# Patient Record
Sex: MALE | Race: WHITE | NOT HISPANIC OR LATINO | Employment: OTHER | URBAN - METROPOLITAN AREA
[De-identification: names, ages, dates, MRNs, and addresses within clinical notes are randomized per-mention and may not be internally consistent; named-entity substitution may affect disease eponyms.]

---

## 2018-08-16 ENCOUNTER — OFFICE VISIT CONVERTED (OUTPATIENT)
Dept: FAMILY MEDICINE CLINIC | Facility: CLINIC | Age: 53
End: 2018-08-16
Attending: FAMILY MEDICINE

## 2020-07-18 ENCOUNTER — OFFICE VISIT CONVERTED (OUTPATIENT)
Dept: FAMILY MEDICINE CLINIC | Facility: CLINIC | Age: 55
End: 2020-07-18
Attending: FAMILY MEDICINE

## 2021-05-07 NOTE — PROGRESS NOTES
Progress Note      Patient Name: Joe Andersen   Patient ID: [id number]   Sex: Male   YOB: 1965        Visit Date: July 18, 2020    Provider: Tamy Winn DO   Location: [facility]   Location Address: [address] [place] [address]   [place]  [address]   Location Phone: [phone]          Chief Complaint     RIGHT EAR PAIN       History Of Present Illness  Joe Andersen is a 55 year old /White male who presents for evaluation and treatment of:      1.) RT EAR PAIN : Complaining of right ear pain and drainage. Reports experiencing edema and tenderness with palpation. No fevers per patient. No recent swimming. No hearing loss.       Past Medical History  Disease Name Date Onset Notes   Seasonal allergies --  --          Past Surgical History  Procedure Name Date Notes   Sinus Surgery --  --          Medication List  Name Date Started Instructions   Flonase Allergy Relief 50 mcg/actuation nasal spray,suspension  --          Allergy List  Allergen Name Date Reaction Notes   NO KNOWN DRUG ALLERGIES --  --  --        Allergies Reconciled  Family Medical History  Disease Name Relative/Age Notes   Father, Grandfather, or Brother developed Heart Disease before the age of 65  --          Social History  Finding Status Start/Stop Quantity Notes   Alcohol Current every day --/-- --  drinks alcohol, 7 or less drinks per week   Exercises regularly --  --/-- --  5-6 times per week   Recreational Drug Use Never --/-- --  never used   Second hand smoke exposure Unknown --/-- --  no   Tobacco Former --/-- 2 PPD former smoker, smokes 1 to 2 packs per day, for 20 years, never uses other tobacco products   Uses seatbelts --  --/-- --  yes         Immunizations  NameDate Admin Mfg Trade Name Lot Number Route Inj VIS Given VIS Publication   Tdap08/16/2018 [facility] ADACEL [id number] IM  08/16/2018 02/24/2015   Comments: NDC [id number]         Review of Systems  · Constitutional  o Denies  o : fatigue,  "night sweats  · Eyes  o Denies  o : double vision, blurred vision  · HENT  o Admits  o : right ear pain  o Denies  o : vertigo, recent head injury  · Cardiovascular  o Denies  o : chest pain, irregular heart beats  · Respiratory  o Denies  o : shortness of breath, productive cough  · Gastrointestinal  o Denies  o : nausea, vomiting  · Genitourinary  o Denies  o : dysuria, urinary retention  · Integument  o Denies  o : hair growth change, new skin lesions  · Neurologic  o Denies  o : altered mental status, seizures  · Musculoskeletal  o Denies  o : joint swelling, limitation of motion  · Endocrine  o Denies  o : cold intolerance, heat intolerance  · Heme-Lymph  o Denies  o : petechiae, lymph node enlargement or tenderness  · Allergic-Immunologic  o Denies  o : frequent illnesses      Vitals  Date Time BP Position Site L\R Cuff Size HR RR TEMP (F) WT  HT  BMI kg/m2 BSA m2 O2 Sat HC       07/18/2020 08:23 /72 Sitting    75 - R  97.3 228lbs 4oz 6'  1\" 30.11 2.31 95 %          Physical Examination  · Constitutional  o Appearance  o : well developed, well-nourished, in no acute distress  · Head and Face  o HEENT  o : Examination of right external ear canal reveals edema, as well as erythema - tenderness noted with otoscope examination - TM unremarkable  · Eyes  o Conjunctivae  o : conjunctivae normal  · Neck  o Inspection/Palpation  o : supple  · Respiratory  o Respiratory Effort  o : breathing unlabored  · Cardiovascular  o Peripheral Vascular System  o :   § Extremities  § : no cyanosis  · Skin and Subcutaneous Tissue  o General Inspection  o : no rash appreciated   · Neurologic  o Gait and Station  o :   § Gait Screening  § : normal gait  · Psychiatric  o Mood and Affect  o : mood normal, affect appropriate              Assessment  · Right otitis externa     380.10/H60.91    A.) Otic drop and oral abx as noted - if no relief contact provider.         Plan  · Orders  o ACO-39: Current medications updated and " reviewed () - - 07/18/2020  · Medications  o hydrocortisone-acetic acid 1-2 % otic (ear) drops   SIG: instill 2 drops into right ear by otic route 3 times per day for 5 days   DISP: (1) 10 ml drop btl with 0 refills  Prescribed on 07/18/2020     o ciprofloxacin HCl 500 mg oral tablet   SIG: take 1 tablet (500 mg) by oral route 2 times per day for 5 days   DISP: (10) tablets with 0 refills  Prescribed on 07/18/2020     o Medications have been Reconciled  o Transition of Care or Provider Policy  · Instructions  o Take all medications as prescribed/directed.  o Patient was educated/instructed on their diagnosis, treatment and medications prior to discharge from the clinic today.  · Disposition  o Call or Return if symptoms worsen or persist.            Electronically Signed by: Tamy Winn DO - on July 18, 2020 08:59:55 AM

## 2021-05-07 NOTE — PROGRESS NOTES
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- no recent TDAP- hemostasis has been achieved- pt will be going to [place] this weekend- pt urged not to put cuts in ocean due to possibility of exotic bacteria getting into cuts- no redness or warmth       Review of Systems  · Constitutional  o Denies  o : fatigue, fever  · Cardiovascular  o Denies  o : chest pain, palpitations  · Respiratory  o Denies  o : shortness of breath, cough      Vitals  Date Time BP Position Site L\R Cuff Size HR RR TEMP(F) WT  HT  BMI kg/m2 BSA m2 O2 Sat HC       08/16/2018 08:35 / Sitting    73 - R  97.2 233lbs 0oz 6'  1\" 30.74 2.33 98 %           Physical Examination  · Constitutional  o Appearance  o : well developed, well-nourished, in no acute distress  · Respiratory  o Respiratory Effort  o : breathing unlabored  o Auscultation of Lungs  o : clear to ascultation  · Cardiovascular  o Heart  o :   § Auscultation of Heart  § : regular rate and rhythm  · Musculoskeletal  o General  o :   § General Musculoskeletal  § : No joint swelling or deformity., Muscle tone, strength, and development grossly normal.  · Skin and Subcutaneous Tissue  o General Inspection  o : left elbow 0.25cm lac, right index finger 3-4mm lac, no redness or warmth  · Neurologic  o Mental Status Examination  o :   § Orientation  § : grossly oriented to person, place and time  o Gait and " Station  o :   § Gait Screening  § : normal gait              Assessment  · Finger laceration     883.0/S61.219A      Plan  · Orders  o ACO-39: Current medications updated and reviewed () - - 08/16/2018  o Boostrix Vaccine [facility] ([id number]) - 883.0/S61.219A - 08/16/2018  · Medications  o Keflex 500 mg oral capsule   SIG: take 1 capsule (500 mg) by oral route every 6 hours for 7 days   DISP: (28) capsules with 0 refills  Prescribed on 08/16/2018     o mupirocin 2 % topical ointment   SIG: apply a small amount to the affected area by topical route 3 times per day for 10 days   DISP: (1) 1 gm pf appli with 0 refills  Prescribed on 08/16/2018     · Instructions  o Patient was educated/instructed on their diagnosis, treatment and medications prior to discharge from the clinic today.            Electronically Signed by: Denny Singh MD -Author on August 16, 2018 08:53:00 PM

## 2021-05-09 VITALS
BODY MASS INDEX: 30.25 KG/M2 | WEIGHT: 228.25 LBS | DIASTOLIC BLOOD PRESSURE: 72 MMHG | SYSTOLIC BLOOD PRESSURE: 128 MMHG | TEMPERATURE: 97.3 F | OXYGEN SATURATION: 95 % | HEIGHT: 73 IN | HEART RATE: 75 BPM

## 2021-05-09 VITALS
HEART RATE: 73 BPM | WEIGHT: 233 LBS | HEIGHT: 73 IN | DIASTOLIC BLOOD PRESSURE: 62 MMHG | SYSTOLIC BLOOD PRESSURE: 128 MMHG | TEMPERATURE: 97.2 F | BODY MASS INDEX: 30.88 KG/M2 | OXYGEN SATURATION: 98 %

## 2021-08-21 ENCOUNTER — APPOINTMENT (OUTPATIENT)
Dept: GENERAL RADIOLOGY | Facility: HOSPITAL | Age: 56
End: 2021-08-21

## 2021-08-21 ENCOUNTER — HOSPITAL ENCOUNTER (EMERGENCY)
Facility: HOSPITAL | Age: 56
Discharge: HOME | End: 2021-08-21
Attending: EMERGENCY MEDICINE | Admitting: EMERGENCY MEDICINE

## 2021-08-21 VITALS
TEMPERATURE: 98.1 F | BODY MASS INDEX: 32.37 KG/M2 | DIASTOLIC BLOOD PRESSURE: 80 MMHG | HEART RATE: 49 BPM | RESPIRATION RATE: 16 BRPM | HEIGHT: 73 IN | SYSTOLIC BLOOD PRESSURE: 122 MMHG | OXYGEN SATURATION: 100 % | WEIGHT: 244.27 LBS

## 2021-08-21 DIAGNOSIS — R07.9 CHEST PAIN IN ADULT: Primary | ICD-10-CM

## 2021-08-21 LAB
ALBUMIN SERPL-MCNC: 4.8 G/DL (ref 3.5–5.2)
ALBUMIN/GLOB SERPL: 2.3 G/DL
ALP SERPL-CCNC: 80 U/L (ref 39–117)
ALT SERPL W P-5'-P-CCNC: 33 U/L (ref 1–41)
ANION GAP SERPL CALCULATED.3IONS-SCNC: 9.1 MMOL/L (ref 5–15)
AST SERPL-CCNC: 20 U/L (ref 1–40)
BASOPHILS # BLD AUTO: 0.04 10*3/MM3 (ref 0–0.2)
BASOPHILS NFR BLD AUTO: 0.6 % (ref 0–1.5)
BILIRUB SERPL-MCNC: 0.6 MG/DL (ref 0–1.2)
BUN SERPL-MCNC: 14 MG/DL (ref 6–20)
BUN/CREAT SERPL: 12.8 (ref 7–25)
CALCIUM SPEC-SCNC: 9.8 MG/DL (ref 8.6–10.5)
CHLORIDE SERPL-SCNC: 103 MMOL/L (ref 98–107)
CK MB SERPL-CCNC: 2.1 NG/ML
CK SERPL-CCNC: 104 U/L (ref 20–200)
CO2 SERPL-SCNC: 27.9 MMOL/L (ref 22–29)
CREAT SERPL-MCNC: 1.09 MG/DL (ref 0.76–1.27)
DEPRECATED RDW RBC AUTO: 39 FL (ref 37–54)
EOSINOPHIL # BLD AUTO: 0.26 10*3/MM3 (ref 0–0.4)
EOSINOPHIL NFR BLD AUTO: 3.8 % (ref 0.3–6.2)
ERYTHROCYTE [DISTWIDTH] IN BLOOD BY AUTOMATED COUNT: 12.3 % (ref 12.3–15.4)
GFR SERPL CREATININE-BSD FRML MDRD: 70 ML/MIN/1.73
GLOBULIN UR ELPH-MCNC: 2.1 GM/DL
GLUCOSE SERPL-MCNC: 70 MG/DL (ref 65–99)
HCT VFR BLD AUTO: 45.6 % (ref 37.5–51)
HGB BLD-MCNC: 16 G/DL (ref 13–17.7)
HOLD SPECIMEN: NORMAL
IMM GRANULOCYTES # BLD AUTO: 0.01 10*3/MM3 (ref 0–0.05)
IMM GRANULOCYTES NFR BLD AUTO: 0.1 % (ref 0–0.5)
LIPASE SERPL-CCNC: 24 U/L (ref 13–60)
LYMPHOCYTES # BLD AUTO: 1.89 10*3/MM3 (ref 0.7–3.1)
LYMPHOCYTES NFR BLD AUTO: 27.4 % (ref 19.6–45.3)
MAGNESIUM SERPL-MCNC: 2.2 MG/DL (ref 1.6–2.6)
MCH RBC QN AUTO: 30.5 PG (ref 26.6–33)
MCHC RBC AUTO-ENTMCNC: 35.1 G/DL (ref 31.5–35.7)
MCV RBC AUTO: 87 FL (ref 79–97)
MONOCYTES # BLD AUTO: 0.68 10*3/MM3 (ref 0.1–0.9)
MONOCYTES NFR BLD AUTO: 9.9 % (ref 5–12)
NEUTROPHILS NFR BLD AUTO: 4.02 10*3/MM3 (ref 1.7–7)
NEUTROPHILS NFR BLD AUTO: 58.2 % (ref 42.7–76)
NRBC BLD AUTO-RTO: 0 /100 WBC (ref 0–0.2)
NT-PROBNP SERPL-MCNC: 10.6 PG/ML (ref 0–900)
PLATELET # BLD AUTO: 185 10*3/MM3 (ref 140–450)
PMV BLD AUTO: 9.8 FL (ref 6–12)
POTASSIUM SERPL-SCNC: 3.9 MMOL/L (ref 3.5–5.2)
PROT SERPL-MCNC: 6.9 G/DL (ref 6–8.5)
QT INTERVAL: 413 MS
QT INTERVAL: 431 MS
RBC # BLD AUTO: 5.24 10*6/MM3 (ref 4.14–5.8)
SODIUM SERPL-SCNC: 140 MMOL/L (ref 136–145)
TROPONIN I SERPL-MCNC: 0 NG/ML (ref 0–0.6)
TROPONIN I SERPL-MCNC: 0 NG/ML (ref 0–0.6)
WBC # BLD AUTO: 6.9 10*3/MM3 (ref 3.4–10.8)
WHOLE BLOOD HOLD SPECIMEN: NORMAL

## 2021-08-21 PROCEDURE — 83880 ASSAY OF NATRIURETIC PEPTIDE: CPT | Performed by: EMERGENCY MEDICINE

## 2021-08-21 PROCEDURE — 85025 COMPLETE CBC W/AUTO DIFF WBC: CPT | Performed by: EMERGENCY MEDICINE

## 2021-08-21 PROCEDURE — 80053 COMPREHEN METABOLIC PANEL: CPT | Performed by: EMERGENCY MEDICINE

## 2021-08-21 PROCEDURE — 83735 ASSAY OF MAGNESIUM: CPT | Performed by: EMERGENCY MEDICINE

## 2021-08-21 PROCEDURE — 71045 X-RAY EXAM CHEST 1 VIEW: CPT | Performed by: RADIOLOGY

## 2021-08-21 PROCEDURE — 93005 ELECTROCARDIOGRAM TRACING: CPT

## 2021-08-21 PROCEDURE — 84484 ASSAY OF TROPONIN QUANT: CPT

## 2021-08-21 PROCEDURE — 93005 ELECTROCARDIOGRAM TRACING: CPT | Performed by: EMERGENCY MEDICINE

## 2021-08-21 PROCEDURE — 82550 ASSAY OF CK (CPK): CPT | Performed by: EMERGENCY MEDICINE

## 2021-08-21 PROCEDURE — 99283 EMERGENCY DEPT VISIT LOW MDM: CPT

## 2021-08-21 PROCEDURE — 71045 X-RAY EXAM CHEST 1 VIEW: CPT

## 2021-08-21 PROCEDURE — 82553 CREATINE MB FRACTION: CPT | Performed by: EMERGENCY MEDICINE

## 2021-08-21 PROCEDURE — 83690 ASSAY OF LIPASE: CPT | Performed by: EMERGENCY MEDICINE

## 2021-08-21 PROCEDURE — 93010 ELECTROCARDIOGRAM REPORT: CPT | Performed by: INTERNAL MEDICINE

## 2021-08-21 RX ORDER — SODIUM CHLORIDE 0.9 % (FLUSH) 0.9 %
10 SYRINGE (ML) INJECTION AS NEEDED
Status: DISCONTINUED | OUTPATIENT
Start: 2021-08-21 | End: 2021-08-21 | Stop reason: HOSPADM

## 2021-08-21 RX ORDER — ASPIRIN 81 MG/1
324 TABLET, CHEWABLE ORAL ONCE
Status: DISCONTINUED | OUTPATIENT
Start: 2021-08-21 | End: 2021-08-21

## 2021-08-21 NOTE — DISCHARGE INSTRUCTIONS
Low-dose, 81 mg, aspirin daily until evaluated by the cardiologist.  Avoid exertion.  Return for worsening persistent chest discomfort or other concerns.

## 2021-08-21 NOTE — ED PROVIDER NOTES
"Time: 11:23 AM EDT  Arrived by: Private car  Chief Complaint:   Chief Complaint   Patient presents with   • Chest Pain     x 2 months     History provided by: Patient  History is limited by: N/A     History of Present Illness:      Joe Andersen is a 56 y.o. male who presents to the emergency department today with complaints of chest pain for the past 3-4 months. He notes that his pains have been intermittent and that they \"come around the sides and on the top sometimes.\" He denies any significant modifying factors, but does state that his discomfort potentially occurs more frequently while he is exerting himself at work. He states that his pain has not necessarily worsened with time in terms of intensity, but that he has been putting off seeing a provider for several months and decided he needed to be evaluated due to its persistence. His discomfort is presently mild.    The patient denies any vomiting or shortness of breath. He does note some diaphoresis occasionally, but believes this is just due to exerting himself at work rather than a direct association with his chest pain. He denies a history of prior blood clots or confirmed myocardial infarction. He does have a history of high cholesterol, but denies a known history of hypertension or diabetes mellitus. He also denies a family history of myocardial infarction prior to age 55. He is a former smoker with last use 7-8 months ago. There are no other acute complaints at this time.      History provided by:  Patient   used: No    Chest Pain  Chest pain location: Diffuse.  Pain quality comment:  \"pain\"  Pain radiates to:  Does not radiate  Pain severity:  Moderate  Onset quality:  Sudden  Duration:  4 months  Timing:  Intermittent  Progression:  Unchanged  Chronicity:  New  Context comment:  Patient has had intermittent chest pains for the past 3-4 months. He is unsure of any definite modiyfing factors but believes his pains might occur more " "frequently at work while exerting himself.  Relieved by:  None tried  Worsened by:  Exertion (possibly)  Ineffective treatments:  None tried  Associated symptoms: no back pain, no cough, no diaphoresis (occasional but patient attributes to normal exertional diaphoresis at work), no fever, no shortness of breath and no vomiting    Risk factors: high cholesterol and male sex    Risk factors: no coronary artery disease, no diabetes mellitus, no hypertension, no prior DVT/PE and no smoking (former)        Similar Symptoms Previously: Yes.  Recently seen: Patient was last seen in the ED on 4/25/2019 with chest pain.      Patient Care Team  Primary Care Provider: Provider, No Known    Past Medical History:     No Known Allergies  No past medical history on file.  No past surgical history on file.  No family history on file.    Home Medications:  Prior to Admission medications    Not on File        Social History:   Social History     Tobacco Use   • Smoking status: Not on file   Substance Use Topics   • Alcohol use: Not on file   • Drug use: Not on file     Recent travel: not applicable     Review of Systems:  Review of Systems   Constitutional: Negative for chills, diaphoresis (occasional but patient attributes to normal exertional diaphoresis at work) and fever.   HENT: Negative for nosebleeds.    Eyes: Negative for redness.   Respiratory: Negative for cough and shortness of breath.    Cardiovascular: Positive for chest pain.   Gastrointestinal: Negative for diarrhea and vomiting.   Genitourinary: Negative for dysuria and frequency.   Musculoskeletal: Negative for back pain and neck pain.   Skin: Negative for rash.   Neurological: Negative for seizures.   All other systems reviewed and are negative.       Physical Exam:  /80 (BP Location: Left arm)   Pulse (!) 49   Temp 98.1 °F (36.7 °C) (Oral)   Resp 16   Ht 185.4 cm (73\")   Wt 111 kg (244 lb 4.3 oz)   SpO2 100%   BMI 32.23 kg/m²     Physical Exam  Vitals " and nursing note reviewed.   Constitutional:       General: He is not in acute distress.  HENT:      Head: Normocephalic and atraumatic.      Nose: Nose normal.      Mouth/Throat:      Mouth: Mucous membranes are moist.   Eyes:      General: No scleral icterus.  Cardiovascular:      Rate and Rhythm: Normal rate and regular rhythm.      Heart sounds: Normal heart sounds. No murmur heard.     Pulmonary:      Effort: No respiratory distress.      Breath sounds: Normal breath sounds.   Chest:      Chest wall: Tenderness (Mild reproducible chest wall discomfort. ) present.   Abdominal:      Palpations: Abdomen is soft.      Tenderness: There is no abdominal tenderness.   Musculoskeletal:         General: No tenderness. Normal range of motion.      Cervical back: Normal range of motion and neck supple. No tenderness.      Right lower leg: No edema.      Left lower leg: No edema.   Skin:     General: Skin is warm and dry.   Neurological:      General: No focal deficit present.      Mental Status: He is alert.      Sensory: No sensory deficit.      Motor: No weakness.   Psychiatric:         Behavior: Behavior normal.                Medications in the Emergency Department:  Medications - No data to display     Labs  Lab Results (last 24 hours)     Procedure Component Value Units Date/Time    POC Troponin I with Hold Tube [[id number]] Collected: 08/21/21 1122    Specimen: Blood Updated: 08/21/21 1136    Narrative:      The following orders were created for panel order POC Troponin I with Hold Tube.  Procedure                               Abnormality         Status                     ---------                               -----------         ------                     POC Troponin I[[id number]]                                                              HOLD Troponin-I Tube[[id number]]                             Final result                 Please view results for these tests on the individual orders.    CBC & Differential  [[id number]]  (Normal) Collected: 08/21/21 1122    Specimen: Blood from Arm, Right Updated: 08/21/21 1136    Narrative:      The following orders were created for panel order CBC & Differential.  Procedure                               Abnormality         Status                     ---------                               -----------         ------                     CBC Auto Differential[[id number]]        Normal              Final result                 Please view results for these tests on the individual orders.    Comprehensive Metabolic Panel [[id number]] Collected: 08/21/21 1122    Specimen: Blood from Arm, Right Updated: 08/21/21 1200     Glucose 70 mg/dL      BUN 14 mg/dL      Creatinine 1.09 mg/dL      Sodium 140 mmol/L      Potassium 3.9 mmol/L      Chloride 103 mmol/L      CO2 27.9 mmol/L      Calcium 9.8 mg/dL      Total Protein 6.9 g/dL      Albumin 4.80 g/dL      ALT (SGPT) 33 U/L      AST (SGOT) 20 U/L      Alkaline Phosphatase 80 U/L      Total Bilirubin 0.6 mg/dL      eGFR Non African Amer 70 mL/min/1.73      Globulin 2.1 gm/dL      A/G Ratio 2.3 g/dL      BUN/Creatinine Ratio 12.8     Anion Gap 9.1 mmol/L     Narrative:      GFR Normal >60  Chronic Kidney Disease <60  Kidney Failure <15      Lipase [[id number]]  (Normal) Collected: 08/21/21 1122    Specimen: Blood from Arm, Right Updated: 08/21/21 1200     Lipase 24 U/L     BNP [[id number]]  (Normal) Collected: 08/21/21 1122    Specimen: Blood from Arm, Right Updated: 08/21/21 1155     proBNP 10.6 pg/mL     Narrative:      Among patients with dyspnea, NT-proBNP is highly sensitive for the detection of acute congestive heart failure. In addition NT-proBNP of <300 pg/ml effectively rules out acute congestive heart failure with 99% negative predictive value.    Results may be falsely decreased if patient taking Biotin.      Magnesium [[id number]]  (Normal) Collected: 08/21/21 1122    Specimen: Blood from Arm, Right Updated: 08/21/21 1200     Magnesium 2.2  mg/dL     CK Total & CKMB [[id number]]  (Normal) Collected: 08/21/21 1122    Specimen: Blood from Arm, Right Updated: 08/21/21 1200     CKMB 2.10 ng/mL      Creatine Kinase 104 U/L     Narrative:      CKMB results may be falsely decreased if patient taking Biotin.    CBC Auto Differential [[id number]]  (Normal) Collected: 08/21/21 1122    Specimen: Blood from Arm, Right Updated: 08/21/21 1136     WBC 6.90 10*3/mm3      RBC 5.24 10*6/mm3      Hemoglobin 16.0 g/dL      Hematocrit 45.6 %      MCV 87.0 fL      MCH 30.5 pg      MCHC 35.1 g/dL      RDW 12.3 %      RDW-SD 39.0 fl      MPV 9.8 fL      Platelets 185 10*3/mm3      Neutrophil % 58.2 %      Lymphocyte % 27.4 %      Monocyte % 9.9 %      Eosinophil % 3.8 %      Basophil % 0.6 %      Immature Grans % 0.1 %      Neutrophils, Absolute 4.02 10*3/mm3      Lymphocytes, Absolute 1.89 10*3/mm3      Monocytes, Absolute 0.68 10*3/mm3      Eosinophils, Absolute 0.26 10*3/mm3      Basophils, Absolute 0.04 10*3/mm3      Immature Grans, Absolute 0.01 10*3/mm3      nRBC 0.0 /100 WBC     POC Troponin I [[id number]]  (Normal) Collected: 08/21/21 1123    Specimen: Blood Updated: 08/21/21 1135     Troponin I 0.00 ng/mL      Comment: Serial Number: [id number]:  [id number]       POC Troponin I [[id number]]  (Normal) Collected: 08/21/21 1325    Specimen: Blood Updated: 08/21/21 1338     Troponin I 0.00 ng/mL      Comment: Serial Number: [id number]:  [id number]              Imaging:  XR Chest 1 View    Result Date: 8/21/2021  PROCEDURE: XR CHEST 1 VW  COMPARISON: [facility], , CHEST AP/PA 1 VIEW, 4/25/2019, 15:19.  INDICATIONS: CHEST PAIN  FINDINGS:  The lungs are clear bilaterally.  The cardiac and mediastinal silhouettes appear normal.  No effusion is seen.  CONCLUSION:  1. No acute cardiopulmonary disease       Rafael Rose M.D.       Electronically Signed and Approved By: Rafael Rose M.D. on 8/21/2021 at 12:02               Procedures:  Procedures        EKG:    Rhythm: Normal sinus rhythm.  Rate: 54.  Normal P waves.  Intervals: Normal.  T-wave: Normal.  ST Segment: Normal.  Normal QT.    EKG Comparison: Unchanged from April 2019.    Interpreted by me.    Progress  ED Course as of Aug 21 2008   Sat Aug 21, 2021   1346 At bedside reevaluating the patient and updating on lab and imaging results. Advised that he is stable for discharge but that he should follow up with cardiology. Patient expresses concern for his continued chest pain. All questions addressed. Advised that he start taking baby aspirin daily at home and avoid exertion. He is agreeable.    [RF]      ED Course User Index  [RF] Gina Braun                            Medical Decision Making:  MDM  Number of Diagnoses or Management Options  Chest pain in adult  Diagnosis management comments: Patient presents complaining of chest pain.  Differential considerations include but not limited to pneumonia versus pneumothorax versus ACS or pulmonary embolism.  PERC score is low making PE unlikely.  EKG and cardiac markers are normal and heart score is 2 making ACS unlikely.  Chest radiograph is read by radiology and reviewed by me does not demonstrate pneumonia or pneumothorax as a source of the patient's symptoms and is discharged stable with outpatient follow-up with cardiology recommended.       Amount and/or Complexity of Data Reviewed  Clinical lab tests: reviewed  Tests in the radiology section of CPT®: reviewed  Tests in the medicine section of CPT®: reviewed    Risk of Complications, Morbidity, and/or Mortality  Presenting problems: high         Final diagnoses:   Chest pain in adult        Disposition:  ED Disposition     ED Disposition Condition Comment    Discharge Stable           Documentation assistance provided by Gina Braun acting as scribe for Dr. González Bridges. Information recorded by the scribe was done at my direction and has been verified and validated by me.        Kade  Gina  08/21/21 1208       Gina Braun  08/21/21 1245       Gina Braun  08/21/21 1347       Gina Braun  08/21/21 1349       González Bridges MD  08/21/21 2008

## 2021-09-02 ENCOUNTER — TRANSCRIBE ORDERS (OUTPATIENT)
Dept: ADMINISTRATIVE | Facility: HOSPITAL | Age: 56
End: 2021-09-02

## 2021-09-02 DIAGNOSIS — R07.9 CHEST PAIN, UNSPECIFIED TYPE: Primary | ICD-10-CM

## 2021-10-12 ENCOUNTER — APPOINTMENT (OUTPATIENT)
Dept: CARDIOLOGY | Facility: HOSPITAL | Age: 56
End: 2021-10-12